# Patient Record
Sex: FEMALE | Race: WHITE | NOT HISPANIC OR LATINO | Employment: FULL TIME | URBAN - METROPOLITAN AREA
[De-identification: names, ages, dates, MRNs, and addresses within clinical notes are randomized per-mention and may not be internally consistent; named-entity substitution may affect disease eponyms.]

---

## 2020-08-31 ENCOUNTER — OFFICE VISIT (OUTPATIENT)
Dept: URGENT CARE | Facility: CLINIC | Age: 20
End: 2020-08-31
Payer: COMMERCIAL

## 2020-08-31 VITALS
OXYGEN SATURATION: 100 % | WEIGHT: 138.2 LBS | HEART RATE: 89 BPM | SYSTOLIC BLOOD PRESSURE: 115 MMHG | HEIGHT: 63 IN | RESPIRATION RATE: 16 BRPM | BODY MASS INDEX: 24.49 KG/M2 | DIASTOLIC BLOOD PRESSURE: 73 MMHG

## 2020-08-31 DIAGNOSIS — L03.114 CELLULITIS OF LEFT UPPER EXTREMITY: Primary | ICD-10-CM

## 2020-08-31 PROCEDURE — 99213 OFFICE O/P EST LOW 20 MIN: CPT | Performed by: PREVENTIVE MEDICINE

## 2020-08-31 RX ORDER — NORETHINDRONE ACETATE AND ETHINYL ESTRADIOL AND FERROUS FUMARATE 1MG-20(24)
1 KIT ORAL DAILY
COMMUNITY
End: 2022-04-20 | Stop reason: SDUPTHER

## 2020-08-31 RX ORDER — CEPHALEXIN 500 MG/1
500 CAPSULE ORAL EVERY 6 HOURS SCHEDULED
Qty: 20 CAPSULE | Refills: 0 | Status: SHIPPED | OUTPATIENT
Start: 2020-08-31 | End: 2020-09-05

## 2020-08-31 NOTE — PATIENT INSTRUCTIONS
Cellulitis   AMBULATORY CARE:   Cellulitis  is a skin infection caused by bacteria  Cellulitis usually appears on the legs and feet, arms and hands, or face  Common signs and symptoms include the following:   · A red, warm, swollen area on your skin    · Pain when the area is touched    · Bumps or blisters (abscess) that may drain pus    · Bumpy, raised skin that feels like an orange peel  Call 911 if:   · You have sudden trouble breathing or chest pain  Seek care immediately if:   · Your wound gets larger and more painful  · You feel a crackling under your skin when you touch it  · You have purple dots or bumps on your skin, or you see bleeding under your skin  · You have new swelling and pain in your legs  · The red, warm, swollen area gets larger  · You see red streaks coming from the infected area  Contact your healthcare provider if:   · You have a fever  · Your fever or pain does not go away or gets worse  · The area does not get smaller after 2 days of antibiotics  · Your skin is flaking or peeling off  · You have questions or concerns about your condition or care  Treatment for cellulitis  may decrease symptoms, stop the infection from spreading, and cure the infection  Treatment depends on how severe your cellulitis is  Cellulitis may go away on its own  You may  instead need antibiotics to help treat the bacterial infection and medicines for pain  Your healthcare provider may draw a [place] around the edges of your cellulitis  If your cellulitis spreads, your healthcare provider will see it outside of the [place]  Manage your symptoms:   · Elevate the area above the level of your heart  as often as you can  This will help decrease swelling and pain  Prop the area on pillows or blankets to keep it elevated comfortably  · Clean the area daily until the wound scabs over  Gently wash the area with soap and water  Pat dry  Use dressings as directed       · Place cool or warm, wet cloths on the area as directed  Use clean cloths and clean water  Leave it on the area until the cloth is room temperature  Pat the area dry with a clean, dry cloth  The cloths may help decrease pain  Prevent cellulitis:   · Do not scratch bug bites or areas of injury  You increase your risk for cellulitis by scratching these areas  · Do not share personal items, such as towels, clothing, and razors  · Clean exercise equipment  with germ-killing  before and after you use it  · Wash your hands often  Use soap and water  Wash your hands after you use the bathroom, change a child's diapers, or sneeze  Wash your hands before you prepare or eat food  Use lotion to prevent dry, cracked skin  · Wear pressure stockings as directed  You may be told to wear the stockings if you have peripheral edema  The stockings improve blood flow and decrease swelling  · Treat athlete's foot  This can help prevent the spread of a bacterial skin infection  Follow up with your healthcare provider within 3 days, or as directed: Your healthcare provider will check if your cellulitis is getting better  You may need different medicine  Write down your questions so you remember to ask them during your visits  © 2017 [facility] Information is for End User's use only and may not be sold, redistributed or otherwise used for commercial purposes  All illustrations and images included in CareNotes® are the copyrighted property of [employer]  or [employer]  The above information is an  only  It is not intended as medical advice for individual conditions or treatments  Talk to your doctor, nurse or pharmacist before following any medical regimen to see if it is safe and effective for you

## 2020-09-14 NOTE — PROGRESS NOTES
[facility]        NAME: Luz Novak is a 23 y o  female  : 2000    MRN: [id number]  DATE: 2020  TIME: 7:25 PM    Assessment and Plan   Cellulitis of left upper extremity [L03 114]  1  Cellulitis of left upper extremity  cephalexin (KEFLEX) 500 mg capsule         Patient Instructions       Follow up with PCP in 3-5 days  Proceed to  ER if symptoms worsen  Chief Complaint     Chief Complaint   Patient presents with    Rash     rash on left upper arm, spread down to forearm, irts, does not itch, noticed it last night          History of Present Illness       Rash   This is a new problem  The current episode started yesterday  The problem is unchanged  The affected locations include the left arm  The rash is characterized by redness and scaling  She was exposed to nothing  Past treatments include nothing  The treatment provided no relief  Review of Systems   Review of Systems   Constitutional: Negative  Respiratory: Negative  Cardiovascular: Negative  Skin: Positive for rash  All other systems reviewed and are negative  Current Medications       Current Outpatient Medications:     fluticasone (FLONASE) 50 mcg/act nasal spray, 1 spray into each nostril daily  , Disp: , Rfl:     norethindrone-ethinyl estradiol-ferrous fumarate (LOESTIN 24 FE) 1-20 MG-MCG(24) per tablet, Take 1 tablet by mouth daily, Disp: , Rfl:     Current Allergies     Allergies as of 2020    (No Known Allergies)            The following portions of the patient's history were reviewed and updated as appropriate: allergies, current medications, past family history, past medical history, past social history, past surgical history and problem list      History reviewed  No pertinent past medical history  History reviewed  No pertinent surgical history  History reviewed  No pertinent family history  Medications have been verified          Objective   /73   Pulse 89 Resp 16   Ht 5' 3" (1 6 m)   Wt 62 7 kg (138 lb 3 2 oz)   SpO2 100%   BMI 24 48 kg/m²        Physical Exam     Physical Exam  Vitals signs and nursing note reviewed  Constitutional:       Appearance: She is well-developed  Cardiovascular:      Rate and Rhythm: Normal rate and regular rhythm  Pulmonary:      Effort: Pulmonary effort is normal       Breath sounds: Normal breath sounds  Skin:     Findings: Erythema and rash present  Rash is macular

## 2021-04-07 ENCOUNTER — OFFICE VISIT (OUTPATIENT)
Dept: URGENT CARE | Facility: CLINIC | Age: 21
End: 2021-04-07
Payer: COMMERCIAL

## 2021-04-07 VITALS
SYSTOLIC BLOOD PRESSURE: 110 MMHG | DIASTOLIC BLOOD PRESSURE: 68 MMHG | WEIGHT: 142 LBS | TEMPERATURE: 98 F | OXYGEN SATURATION: 100 % | HEIGHT: 63 IN | BODY MASS INDEX: 25.16 KG/M2 | RESPIRATION RATE: 18 BRPM | HEART RATE: 95 BPM

## 2021-04-07 DIAGNOSIS — R00.2 PALPITATIONS: ICD-10-CM

## 2021-04-07 DIAGNOSIS — R07.89 CHEST TIGHTNESS: Primary | ICD-10-CM

## 2021-04-07 LAB
ATRIAL RATE: 88 BPM
P AXIS: 85 DEGREES
PR INTERVAL: 130 MS
QRS AXIS: 63 DEGREES
QRSD INTERVAL: 100 MS
QT INTERVAL: 366 MS
QTC INTERVAL: 442 MS
T WAVE AXIS: 58 DEGREES
VENTRICULAR RATE: 88 BPM

## 2021-04-07 PROCEDURE — 99214 OFFICE O/P EST MOD 30 MIN: CPT | Performed by: PHYSICIAN ASSISTANT

## 2021-04-07 PROCEDURE — 93010 ELECTROCARDIOGRAM REPORT: CPT | Performed by: INTERNAL MEDICINE

## 2021-04-09 NOTE — PROGRESS NOTES
[facility]        NAME: Carissa Meza is a 23 y o  female  : 2000    MRN: [id number]  DATE: 2021  TIME: 12:00 PM    Assessment and Plan   Chest tightness [R07 89]  1  Chest tightness     2  Palpitations       Patient Instructions     Reviewed with pt EKG is WNL  Advised:    Increase water intake  Practice stress relieving measures  Complete prednisone as prescribed  Follow up with a cardiologist in 3-5 days  Proceed to the ER if symptoms worsen  Chief Complaint     Chief Complaint   Patient presents with    Shortness of Breath     Pt reports of shortness of breath with "fluttering" S/S started last week,      History of Present Illness   24 y/o female presenting with c/o shortness of breath x 1 week  SOB is intermittent, primarily felt when first awakening and with falling asleep  Denies feeling like she can't catch her breath but rather that she is unable to take a full deep breath  This does not awaken her from sleep  Sx felt on left side greater than right  Reports some soreness and tightness of muscles of her lower rib cage at onset, but reports this has resolved with taking prednisone over past 3 days  Was prescribed prednisone for enlarged tonsils, but states her chest discomfort was not evaluated at that visit despite its presence  She also c/o heart "fluttering", reporting a sensation of her heart beating faster  This has been occurring for past 3 months  It typically occurs when lying down at bedtime  Denies missed/skipped beats  No sensation of heart stopping, associated chest pain, N/V, sweating, headache, dizziness, jaw / arm/ or neck pain  Notes she has had increased anxiety over this time  No change in activity level or diet  Denies hx of cardiac or pulmonary conditions  Hs previously had EKG due to near syncope from dehydration years ago that as negative  Review of Systems   Review of Systems   Constitutional: Negative for fever     Respiratory: Positive for chest tightness and shortness of breath  Negative for cough and wheezing  Cardiovascular: Positive for palpitations  Negative for chest pain  Gastrointestinal: Negative for abdominal pain, diarrhea, nausea and vomiting  Musculoskeletal: Negative for arthralgias, joint swelling and myalgias  Neurological: Negative for dizziness, syncope, weakness, numbness and headaches  Current Medications       Current Outpatient Medications:     fluticasone (FLONASE) 50 mcg/act nasal spray, 1 spray into each nostril daily  , Disp: , Rfl:     norethindrone-ethinyl estradiol-ferrous fumarate (LOESTIN 24 FE) 1-20 MG-MCG(24) per tablet, Take 1 tablet by mouth daily, Disp: , Rfl:     Current Allergies     Allergies as of 04/07/2021 - Reviewed 04/07/2021   Allergen Reaction Noted    Nuts - food allergy Anaphylaxis and Rash 09/09/2019            The following portions of the patient's history were reviewed and updated as appropriate: allergies, current medications, past family history, past medical history, past social history, past surgical history and problem list      History reviewed  No pertinent past medical history  History reviewed  No pertinent surgical history  History reviewed  No pertinent family history  Medications have been verified  Objective   /68   Pulse 95   Temp 98 °F (36 7 °C)   Resp 18   Ht 5' 3" (1 6 m)   Wt 64 4 kg (142 lb)   SpO2 100%   BMI 25 15 kg/m²   No LMP recorded  EKG:      Ref Range & Units 4/7/21 10:31 AM   Ventricular Rate BPM 88    Atrial Rate BPM 88    ND Interval ms 130    QRSD Interval ms 100    QT Interval ms 366    QTC Interval ms 442    P Axis degrees 85    QRS Axis degrees 63    T Wave Axis degrees 58       Narrative & Impression    Normal sinus rhythm  Normal ECG       Physical Exam     Physical Exam  Vitals signs and nursing note reviewed  Constitutional:       General: She is not in acute distress       Appearance: She is well-developed  She is not ill-appearing or diaphoretic  HENT:      Head: Normocephalic and atraumatic  Eyes:      General: Lids are normal       Conjunctiva/sclera: Conjunctivae normal    Cardiovascular:      Rate and Rhythm: Normal rate and regular rhythm  Pulmonary:      Effort: Pulmonary effort is normal  No respiratory distress  Breath sounds: Normal breath sounds  No decreased breath sounds, wheezing, rhonchi or rales  Abdominal:      General: Bowel sounds are normal  There is no distension  Palpations: Abdomen is soft  Abdomen is not rigid  There is no mass  Tenderness: There is no abdominal tenderness  There is no guarding or rebound  Skin:     General: Skin is warm and dry  Neurological:      General: No focal deficit present  Mental Status: She is alert  She is not disoriented  Cranial Nerves: Cranial nerves are intact  Coordination: Coordination normal       Gait: Gait normal    Psychiatric:         Behavior: Behavior normal  Behavior is cooperative  Thought Content:  Thought content normal          Judgment: Judgment normal

## 2022-04-20 ENCOUNTER — OFFICE VISIT (OUTPATIENT)
Dept: URGENT CARE | Facility: CLINIC | Age: 22
End: 2022-04-20
Payer: COMMERCIAL

## 2022-04-20 ENCOUNTER — HOSPITAL ENCOUNTER (EMERGENCY)
Facility: HOSPITAL | Age: 22
Discharge: HOME | End: 2022-04-20
Attending: EMERGENCY MEDICINE
Payer: COMMERCIAL

## 2022-04-20 ENCOUNTER — APPOINTMENT (EMERGENCY)
Dept: RADIOLOGY | Facility: HOSPITAL | Age: 22
End: 2022-04-20
Payer: COMMERCIAL

## 2022-04-20 VITALS
OXYGEN SATURATION: 100 % | TEMPERATURE: 97.3 F | BODY MASS INDEX: 23.92 KG/M2 | HEIGHT: 63 IN | DIASTOLIC BLOOD PRESSURE: 95 MMHG | HEART RATE: 80 BPM | SYSTOLIC BLOOD PRESSURE: 145 MMHG | WEIGHT: 135 LBS | RESPIRATION RATE: 20 BRPM

## 2022-04-20 VITALS — RESPIRATION RATE: 14 BRPM | OXYGEN SATURATION: 98 % | TEMPERATURE: 97.1 F | HEART RATE: 71 BPM

## 2022-04-20 DIAGNOSIS — R20.2 PARESTHESIAS: Primary | ICD-10-CM

## 2022-04-20 DIAGNOSIS — E34.8 PINEAL GLAND CYST: ICD-10-CM

## 2022-04-20 DIAGNOSIS — R20.0 NUMBNESS AND TINGLING: Primary | ICD-10-CM

## 2022-04-20 DIAGNOSIS — R20.2 NUMBNESS AND TINGLING: Primary | ICD-10-CM

## 2022-04-20 PROCEDURE — 70450 CT HEAD/BRAIN W/O DYE: CPT

## 2022-04-20 PROCEDURE — G1004 CDSM NDSC: HCPCS

## 2022-04-20 PROCEDURE — 99284 EMERGENCY DEPT VISIT MOD MDM: CPT

## 2022-04-20 PROCEDURE — 99284 EMERGENCY DEPT VISIT MOD MDM: CPT | Performed by: EMERGENCY MEDICINE

## 2022-04-20 PROCEDURE — 99213 OFFICE O/P EST LOW 20 MIN: CPT | Performed by: FAMILY MEDICINE

## 2022-04-20 RX ORDER — NORETHINDRONE ACETATE AND ETHINYL ESTRADIOL, ETHINYL ESTRADIOL AND FERROUS FUMARATE 1MG-10(24)
KIT ORAL
COMMUNITY
Start: 2022-03-31

## 2022-04-20 RX ORDER — EPINEPHRINE 0.3 MG/.3ML
INJECTION SUBCUTANEOUS
COMMUNITY
Start: 2022-03-29

## 2022-04-20 NOTE — Clinical Note
Alfa Rizo was seen and treated in our emergency department on 4/20/2022  Diagnosis:     Arpan Latif  may return to work on return date  She may return on this date: 04/22/2022         If you have any questions or concerns, please don't hesitate to call        Mariah Pereyra DO    ______________________________           _______________          _______________  Hospital Representative                              Date                                Time

## 2022-04-20 NOTE — PATIENT INSTRUCTIONS
It was explained to the patient that her symptoms require further evaluation and testing beyond what we are able to perform at our facility  I have instructed for the patient to be seen in the ER at this time, however patient has declined  She states she will discuss with her family and then possibly proceed to the ER, however not at this exact time  AMA form was signed and completed  I have emphasized to the patient that if her symptoms acutely worsen or any new symptoms present, she should be seen in the ER immediately

## 2022-04-20 NOTE — PROGRESS NOTES
[facility]        NAME: Lizeth Archer is a 24 y o  female  : 2000    MRN: [id number]  DATE: 2022  TIME: 7:12 PM    Assessment and Plan   Numbness and tingling [R20 0, R20 2]  1  Numbness and tingling           Patient Instructions     Patient Instructions   It was explained to the patient that her symptoms require further evaluation and testing beyond what we are able to perform at our facility  I have instructed for the patient to be seen in the ER at this time, however patient has declined  She states she will discuss with her family and then possibly proceed to the ER, however not at this exact time  AMA form was signed and completed  I have emphasized to the patient that if her symptoms acutely worsen or any new symptoms present, she should be seen in the ER immediately  Follow up with PCP in 3-5 days  Proceed to  ER if symptoms worsen  Chief Complaint     Chief Complaint   Patient presents with    Numbness     pt presents with right sided numbness started yesterday         History of Present Illness       25 yo female, presents to the office complaining of numbness and tingling of her entire right side, including scalp, face, neck, back, R upper extremity, and R lower extremity, symptoms do not cross the midline  She states the symptoms began yesterday and have been intermittent  She denies any associated generalized or focal weakness  No headache or dizziness  No eye pain or vision changes  No facial asymmetry or weakness  No slurred speech  No trouble swallowing  No syncopal episodes  No balance, coordination, or gait issues  No memory or concentration issues  No recent falls or head injuries  No chest pain, SOB, or palpations  No associated pain anywhere in the body  No cold/URI symptoms  No fever/chills or body aches  No abdominal pain or GI symptoms  No skin rashes  She denies any history of migraines or multiple sclerosis   No family history of similar symptoms or neurological problems  She denies any chance of pregnancy  She states she had a similar episode of numbness and tingling of the right side of her body 2 months ago, at which time she was seen via telemedicine visit, no testing/evaluation was performed at the time, patient states she was treated with a course of Prednisone and her symptoms improved  She states she otherwise feels well and has no medical problems  Review of Systems   Review of Systems   Constitutional: Negative  HENT: Negative  Eyes: Negative  Respiratory: Negative  Cardiovascular: Negative  Gastrointestinal: Negative  Endocrine: Negative  Genitourinary: Negative  Musculoskeletal: Negative  Skin: Negative  Allergic/Immunologic: Positive for food allergies  Neurological: Positive for numbness  Negative for dizziness, tremors, seizures, syncope, facial asymmetry, speech difficulty, weakness, light-headedness and headaches  As noted in HPI   Hematological: Negative  Psychiatric/Behavioral: Negative  Current Medications       Current Outpatient Medications:     EPINEPHrine (EPIPEN) 0 3 mg/0 3 mL SOAJ, INJECT 0 3 ML INTRAMUSCULARLY ONCE AS NEEDED FOR ANAPHYLAXIS, Disp: , Rfl:     Lo Loestrin Fe 1 MG-10 MCG / 10 MCG TABS, TAKE 1 TABLET BY MOUTH EVERY DAY AS ONE DOSE, Disp: , Rfl:     Current Allergies     Allergies as of 04/20/2022 - Reviewed 04/20/2022   Allergen Reaction Noted    Nuts - food allergy Anaphylaxis and Rash 09/09/2019            The following portions of the patient's history were reviewed and updated as appropriate: allergies, current medications, past family history, past medical history, past social history, past surgical history and problem list      Past Medical History:   Diagnosis Date    Patient denies medical problems        Past Surgical History:   Procedure Laterality Date    WISDOM TOOTH EXTRACTION         History reviewed  No pertinent family history        Medications have been verified  Objective   Pulse 71   Temp (!) 97 1 °F (36 2 °C)   Resp 14   SpO2 98%   No LMP recorded  (Menstrual status: Birth Control)  Physical Exam     Physical Exam  Vitals and nursing note reviewed  Constitutional:       General: She is awake  She is not in acute distress  Appearance: Normal appearance  She is well-developed, well-groomed and normal weight  She is not ill-appearing, toxic-appearing or diaphoretic  HENT:      Head: Normocephalic and atraumatic  Right Ear: Tympanic membrane, ear canal and external ear normal       Left Ear: Tympanic membrane, ear canal and external ear normal       Nose: Nose normal       Mouth/Throat:      Lips: Pink  Mouth: Mucous membranes are moist       Pharynx: Oropharynx is clear  Uvula midline  Eyes:      General: Lids are normal  Vision grossly intact  Gaze aligned appropriately  Extraocular Movements: Extraocular movements intact  Conjunctiva/sclera: Conjunctivae normal       Pupils: Pupils are equal, round, and reactive to light  Neck:      Trachea: Trachea and phonation normal    Cardiovascular:      Rate and Rhythm: Normal rate and regular rhythm  Pulses: Normal pulses  Heart sounds: Normal heart sounds  Pulmonary:      Effort: Pulmonary effort is normal  No tachypnea, accessory muscle usage or respiratory distress  Breath sounds: Normal breath sounds and air entry  Abdominal:      General: Abdomen is flat  There is no distension  Palpations: Abdomen is soft  Tenderness: There is no abdominal tenderness  Musculoskeletal:         General: No swelling, tenderness, deformity or signs of injury  Normal range of motion  Cervical back: Full passive range of motion without pain, normal range of motion and neck supple  No edema, erythema, signs of trauma, rigidity or tenderness  No pain with movement, spinous process tenderness or muscular tenderness  Normal range of motion  Right lower leg: No edema  Left lower leg: No edema  Skin:     General: Skin is warm and dry  Capillary Refill: Capillary refill takes less than 2 seconds  Coloration: Skin is not pale  Findings: No abrasion, bruising, ecchymosis, erythema, signs of injury, rash or wound  Neurological:      General: No focal deficit present  Mental Status: She is alert and oriented to person, place, and time  Mental status is at baseline  Cranial Nerves: Cranial nerves are intact  Sensory: Sensation is intact  Motor: Motor function is intact  Coordination: Coordination is intact  Gait: Gait is intact  Deep Tendon Reflexes: Reflexes are normal and symmetric  Psychiatric:         Attention and Perception: Attention and perception normal          Mood and Affect: Mood and affect normal          Speech: Speech normal          Behavior: Behavior normal  Behavior is cooperative  Thought Content:  Thought content normal          Cognition and Memory: Cognition and memory normal          Judgment: Judgment normal

## 2022-04-21 ENCOUNTER — TELEPHONE (OUTPATIENT)
Dept: NEUROLOGY | Facility: CLINIC | Age: 22
End: 2022-04-21

## 2022-04-21 NOTE — TELEPHONE ENCOUNTER
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

## 2022-04-21 NOTE — ED PROVIDER NOTES
History  Chief Complaint   Patient presents with    Numbness     C/O L arm, L thigh, and L sided facial numbness that started yesterday evening  Patient reports she had an episode of numbness on 02/23 "it started with my arm went to my leg and then my face and scalp  Its only on the left and I don't have any difference in strenght from Right to Left " Pt reports her Dr did a tele health visit and said it was a pinched nerve  Pt sent by urgent care  Patient here with complaint of intermittent episodes of right sided paresthesias  She states the symptoms extend from her head into her upper and lower extremities  She denies weakness  She describes it as a "pins and needle sensation "  Initial episode occurred in February, only to her upper extremity and resolved after 5 days of prednisone  Current episode began yesterday  She denies a headache  She denies other prior medical history  History provided by:  Patient   used: No        Prior to Admission Medications   Prescriptions Last Dose Informant Patient Reported? Taking? EPINEPHrine (EPIPEN) 0 3 mg/0 3 mL SOAJ Unknown at Unknown time Self Yes No   Sig: INJECT 0 3 ML INTRAMUSCULARLY ONCE AS NEEDED FOR ANAPHYLAXIS   Lo Loestrin Fe 1 MG-10 MCG / 10 MCG TABS 4/20/2022 at Unknown time  Yes Yes   Sig: TAKE 1 TABLET BY MOUTH EVERY DAY AS ONE DOSE      Facility-Administered Medications: None       Past Medical History:   Diagnosis Date    Patient denies medical problems        Past Surgical History:   Procedure Laterality Date    WISDOM TOOTH EXTRACTION         History reviewed  No pertinent family history  I have reviewed and agree with the history as documented      E-Cigarette/Vaping    E-Cigarette Use Current Some Day User      E-Cigarette/Vaping Substances    Nicotine No     THC No     CBD No     Flavoring No     Other No     Unknown No      Social History     Tobacco Use    Smoking status: Never Smoker    Smokeless tobacco: Never Used   Vaping Use    Vaping Use: Some days   Substance Use Topics    Alcohol use: Yes     Alcohol/week: 2 0 standard drinks     Types: 2 Glasses of wine per week     Comment: "a few times a week "    Drug use: No       Review of Systems   Constitutional: Negative for chills and fever  Respiratory: Negative for cough, chest tightness and shortness of breath  Gastrointestinal: Negative for abdominal pain, diarrhea, nausea and vomiting  Genitourinary: Negative for dysuria, frequency, hematuria and urgency  Musculoskeletal: Negative for back pain, neck pain and neck stiffness  Neurological: Negative for dizziness, tremors, syncope, facial asymmetry and weakness  All other systems reviewed and are negative  Physical Exam  Physical Exam  Vitals and nursing note reviewed  Constitutional:       General: She is not in acute distress  Appearance: She is well-developed  She is not diaphoretic  HENT:      Head: Normocephalic and atraumatic  Eyes:      Conjunctiva/sclera: Conjunctivae normal       Pupils: Pupils are equal, round, and reactive to light  Cardiovascular:      Rate and Rhythm: Normal rate and regular rhythm  Heart sounds: Normal heart sounds  No murmur heard  Pulmonary:      Effort: Pulmonary effort is normal  No respiratory distress  Breath sounds: Normal breath sounds  Abdominal:      General: Bowel sounds are normal  There is no distension  Palpations: Abdomen is soft  Tenderness: There is no abdominal tenderness  Musculoskeletal:         General: No deformity  Normal range of motion  Cervical back: Normal range of motion and neck supple  Skin:     General: Skin is warm and dry  Capillary Refill: Capillary refill takes less than 2 seconds  Coloration: Skin is not pale  Findings: No rash  Neurological:      General: No focal deficit present  Mental Status: She is alert and oriented to person, place, and time  Cranial Nerves: No cranial nerve deficit  Psychiatric:         Behavior: Behavior normal          Vital Signs  ED Triage Vitals [04/20/22 1937]   Temperature Pulse Respirations Blood Pressure SpO2   (!) 97 3 °F (36 3 °C) 80 20 145/95 100 %      Temp Source Heart Rate Source Patient Position - Orthostatic VS BP Location FiO2 (%)   Temporal Monitor Sitting Right arm --      Pain Score       No Pain           Vitals:    04/20/22 1937   BP: 145/95   Pulse: 80   Patient Position - Orthostatic VS: Sitting         Visual Acuity      ED Medications  Medications - No data to display    Diagnostic Studies  Results Reviewed     None                 CT head without contrast   Final Result by Lalit Briones MD (04/20 2157)      No acute intracranial abnormality  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   Because the patient is only 24years of age and the cyst is greater than 10 mm in size, more definitive characterization with nonemergent outpatient follow-up contrast enhanced brain MRI is recommended  This examination was marked "immediate notification" in Epic in order to begin the standard process by which the radiology reading room liaison alerts the referring practitioner  Workstation performed: [id number]                    Procedures  Procedures         ED Course                               SBIRT 20yo+      Most Recent Value   SBIRT (23 yo +)    In order to provide better care to our patients, we are screening all of our patients for alcohol and drug use  Would it be okay to ask you these screening questions? 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  Diagnosis management comments: I reviewed CT report patient and mom  She agrees with outpatient follow-up with Neurology  She is otherwise stable at this time    She return to emergency room for worsening symptoms  Amount and/or Complexity of Data Reviewed  Tests in the radiology section of CPT®: ordered and reviewed    Risk of Complications, Morbidity, and/or Mortality  Presenting problems: high  Diagnostic procedures: high  Management options: high    Patient Progress  Patient progress: stable      Disposition  Final diagnoses:   Paresthesias   Pineal gland cyst     Time reflects when diagnosis was documented in both MDM as applicable and the Disposition within this note     Time User Action Codes Description Comment    4/20/2022 10:13 PM Chastity Fried Add [R20 2] Paresthesias     4/20/2022 10:13 PM Chastity MARTINEZ Add [E34 8] Pineal gland cyst       ED Disposition     ED Disposition Condition Date/Time Comment    Discharge Stable Wed Apr 20, 2022 10:13 PM Juliane Castro discharge to home/self care  Follow-up Information     Follow up With Specialties Details Why Contact Info    Jerod Casarez MD Pediatrics Schedule an appointment as soon as possible for a visit in 2 days for follow up Yudelka Cabezas MD Neurology Schedule an appointment as soon as possible for a visit in 1 day for follow up [address] [facility] [address]  [phone]            Discharge Medication List as of 4/20/2022 10:17 PM      CONTINUE these medications which have NOT CHANGED    Details   EPINEPHrine (EPIPEN) 0 3 mg/0 3 mL SOAJ INJECT 0 3 ML INTRAMUSCULARLY ONCE AS NEEDED FOR ANAPHYLAXIS, Historical Med      Lo Loestrin Fe 1 MG-10 MCG / 10 MCG TABS TAKE 1 TABLET BY MOUTH EVERY DAY AS ONE DOSE, Historical Med             No discharge procedures on file      PDMP Review     None          ED Provider  Electronically Signed by           Nato Abreu DO  04/25/22 1737

## 2022-04-21 NOTE — DISCHARGE INSTRUCTIONS
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